# Patient Record
Sex: MALE | Race: WHITE | HISPANIC OR LATINO | Employment: FULL TIME | URBAN - NONMETROPOLITAN AREA
[De-identification: names, ages, dates, MRNs, and addresses within clinical notes are randomized per-mention and may not be internally consistent; named-entity substitution may affect disease eponyms.]

---
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
## 2021-08-19 LAB
ALBUMIN SERPL-MCNC: 4.7 G/DL (ref 3.4–5)
ALBUMIN/GLOB SERPL: 1.7 {RATIO}
ALP SERPL-CCNC: 57 U/L (ref 50–144)
ALT SERPL-CCNC: 14 U/L (ref 1–45)
ANION GAP SERPL CALC-SCNC: 7 MMOL/L (ref 7–16)
AST SERPL-CCNC: 18 U/L (ref 17–59)
BASOPHILS # BLD AUTO: 0.04 X10(3)/MCL (ref 0.01–0.08)
BASOPHILS NFR BLD AUTO: 0.7 % (ref 0.1–1.2)
BILIRUB SERPL-MCNC: 0.75 MG/DL (ref 0.1–1)
BUN SERPL-MCNC: 16 MG/DL (ref 7–20)
CALCIUM SERPL-MCNC: 9.8 MG/DL (ref 8.4–10.2)
CHLORIDE SERPL-SCNC: 102 MMOL/L (ref 94–110)
CO2 SERPL-SCNC: 29 MMOL/L (ref 21–32)
CREAT SERPL-MCNC: 1.1 MG/DL (ref 0.66–1.25)
CREAT/UREA NIT SERPL: 14.5 (ref 12–20)
EOSINOPHIL # BLD AUTO: 0.1 X10(3)/MCL (ref 0.04–0.54)
EOSINOPHIL NFR BLD AUTO: 1.7 % (ref 0.7–7)
ERYTHROCYTE [DISTWIDTH] IN BLOOD BY AUTOMATED COUNT: 12.7 % (ref 11.6–14.4)
EST CREAT CLEARANCE SER (OHS): 124.7 ML/MIN
GLOBULIN SER-MCNC: 2.8 G/DL (ref 2–3.9)
GLUCOSE SERPL-MCNC: 84 MGM./DL (ref 70–115)
HCT VFR BLD AUTO: 46.8 % (ref 36–52)
HGB BLD-MCNC: 15.8 G/DL (ref 13–18)
HIV 1+2 AB+HIV1 P24 AG SERPL QL IA: NORMAL
IMM GRANULOCYTES # BLD AUTO: 0.01 X10E3/UL (ref 0–0.03)
IMM GRANULOCYTES NFR BLD AUTO: 0.2 % (ref 0–0.5)
LYMPHOCYTES # BLD AUTO: 1.48 X10(3)/MCL (ref 1.32–3.57)
LYMPHOCYTES NFR BLD AUTO: 25.5 % (ref 20–55)
MCH RBC QN AUTO: 27.7 PG (ref 27–34)
MCHC RBC AUTO-ENTMCNC: 33.8 G/DL (ref 31–37)
MCV RBC AUTO: 82 FL (ref 79–99)
MONOCYTES # BLD AUTO: 0.61 X10(3)/MCL (ref 0.3–0.82)
MONOCYTES NFR BLD AUTO: 10.5 % (ref 4.7–12.5)
NEUTROPHILS # BLD AUTO: 3.56 X10(3)/MCL (ref 1.78–5.38)
NEUTROPHILS NFR BLD AUTO: 61.4 % (ref 37–73)
PLATELET # BLD AUTO: 252 X10(3)/MCL (ref 140–371)
PMV BLD AUTO: 9.4 FL (ref 9.4–12.4)
POTASSIUM SERPL-SCNC: 4.3 MMOL/L (ref 3.5–5.1)
PROT SERPL-MCNC: 7.5 G/DL (ref 6.3–8.2)
RBC # BLD AUTO: 5.71 X10(6)/MCL (ref 4–6)
RPR SER QL: NORMAL
SODIUM SERPL-SCNC: 138 MMOL/L (ref 135–145)
T4 FREE SERPL-MCNC: 1.12 NG/DL (ref 0.78–2.19)
TSH SERPL-ACNC: 1.24 UIU/ML (ref 0.36–3.74)
WBC # SPEC AUTO: 5.8 X10(3)/MCL (ref 4–11.5)

## 2022-04-09 ENCOUNTER — HISTORICAL (OUTPATIENT)
Dept: ADMINISTRATIVE | Facility: HOSPITAL | Age: 22
End: 2022-04-09

## 2022-04-27 VITALS
HEIGHT: 69 IN | WEIGHT: 186.5 LBS | DIASTOLIC BLOOD PRESSURE: 74 MMHG | BODY MASS INDEX: 27.62 KG/M2 | SYSTOLIC BLOOD PRESSURE: 120 MMHG | OXYGEN SATURATION: 99 %

## 2022-04-30 ENCOUNTER — HISTORICAL (OUTPATIENT)
Dept: ADMINISTRATIVE | Facility: HOSPITAL | Age: 22
End: 2022-04-30

## 2023-10-04 ENCOUNTER — OFFICE VISIT (OUTPATIENT)
Dept: FAMILY MEDICINE | Facility: CLINIC | Age: 23
End: 2023-10-04
Payer: MEDICAID

## 2023-10-04 VITALS
DIASTOLIC BLOOD PRESSURE: 80 MMHG | WEIGHT: 177 LBS | HEART RATE: 68 BPM | OXYGEN SATURATION: 98 % | TEMPERATURE: 98 F | HEIGHT: 69 IN | SYSTOLIC BLOOD PRESSURE: 116 MMHG | BODY MASS INDEX: 26.22 KG/M2

## 2023-10-04 DIAGNOSIS — F41.9 ANXIOUS MOOD: ICD-10-CM

## 2023-10-04 DIAGNOSIS — F90.9 ATTENTION DEFICIT HYPERACTIVITY DISORDER (ADHD), UNSPECIFIED ADHD TYPE: Primary | ICD-10-CM

## 2023-10-04 DIAGNOSIS — F32.A DEPRESSION, UNSPECIFIED DEPRESSION TYPE: ICD-10-CM

## 2023-10-04 PROBLEM — K59.00 CONSTIPATION: Status: ACTIVE | Noted: 2023-10-04

## 2023-10-04 PROCEDURE — 1159F MED LIST DOCD IN RCRD: CPT | Mod: CPTII,,, | Performed by: NURSE PRACTITIONER

## 2023-10-04 PROCEDURE — 3079F PR MOST RECENT DIASTOLIC BLOOD PRESSURE 80-89 MM HG: ICD-10-PCS | Mod: CPTII,,, | Performed by: NURSE PRACTITIONER

## 2023-10-04 PROCEDURE — 1159F PR MEDICATION LIST DOCUMENTED IN MEDICAL RECORD: ICD-10-PCS | Mod: CPTII,,, | Performed by: NURSE PRACTITIONER

## 2023-10-04 PROCEDURE — 1160F PR REVIEW ALL MEDS BY PRESCRIBER/CLIN PHARMACIST DOCUMENTED: ICD-10-PCS | Mod: CPTII,,, | Performed by: NURSE PRACTITIONER

## 2023-10-04 PROCEDURE — 3079F DIAST BP 80-89 MM HG: CPT | Mod: CPTII,,, | Performed by: NURSE PRACTITIONER

## 2023-10-04 PROCEDURE — 3008F BODY MASS INDEX DOCD: CPT | Mod: CPTII,,, | Performed by: NURSE PRACTITIONER

## 2023-10-04 PROCEDURE — 3008F PR BODY MASS INDEX (BMI) DOCUMENTED: ICD-10-PCS | Mod: CPTII,,, | Performed by: NURSE PRACTITIONER

## 2023-10-04 PROCEDURE — 3074F SYST BP LT 130 MM HG: CPT | Mod: CPTII,,, | Performed by: NURSE PRACTITIONER

## 2023-10-04 PROCEDURE — 99213 OFFICE O/P EST LOW 20 MIN: CPT | Mod: ,,, | Performed by: NURSE PRACTITIONER

## 2023-10-04 PROCEDURE — 3074F PR MOST RECENT SYSTOLIC BLOOD PRESSURE < 130 MM HG: ICD-10-PCS | Mod: CPTII,,, | Performed by: NURSE PRACTITIONER

## 2023-10-04 PROCEDURE — 99213 PR OFFICE/OUTPT VISIT, EST, LEVL III, 20-29 MIN: ICD-10-PCS | Mod: ,,, | Performed by: NURSE PRACTITIONER

## 2023-10-04 PROCEDURE — 1160F RVW MEDS BY RX/DR IN RCRD: CPT | Mod: CPTII,,, | Performed by: NURSE PRACTITIONER

## 2023-10-04 RX ORDER — LISDEXAMFETAMINE DIMESYLATE 40 MG/1
40 CAPSULE ORAL EVERY MORNING
Qty: 30 CAPSULE | Refills: 0 | Status: SHIPPED | OUTPATIENT
Start: 2023-10-04 | End: 2023-10-30

## 2023-10-04 NOTE — PROGRESS NOTES
Patient ID: [id number]     Chief Complaint: ADHD      HPI:     Blaise Heller is a 23 y.o. male in the office with complaints of uncontrolled symptoms of ADHD.  He graduated from college and has been off of his medications for about 2 years.  He is working full-time now as a .  He is having trouble with staying on task and not making good use of his time.  This is becoming a problem at work.  He is looking for a job in his field but notes that this will take some time.  He is starting to get depressed and feels anxious due to the uncontrolled symptoms of ADHD.  He denies any thoughts of harm to himself.  He is not currently in therapy.  He has considered it but not sure when he can find time to go.  He is struggling financially now that he is on his own.      10/4/2023     9:10 AM   Depression Patient Health Questionnaire   Over the last two weeks how often have you been bothered by little interest or pleasure in doing things Several days   Over the last two weeks how often have you been bothered by feeling down, depressed or hopeless Nearly every day   PHQ-2 Total Score 4   Over the last two weeks how often have you been bothered by trouble falling or staying asleep, or sleeping too much Several days   Over the last two weeks how often have you been bothered by feeling tired or having little energy Several days   Over the last two weeks how often have you been bothered by a poor appetite or overeating Not at all   Over the last two weeks how often have you been bothered by feeling bad about yourself - or that you are a failure or have let yourself or your family down Not at all   Over the last two weeks how often have you been bothered by trouble concentrating on things, such as reading the newspaper or watching television Nearly every day   Over the last two weeks how often have you been bothered by moving or speaking so slowly that other people could have noticed. Or the opposite - being so fidgety or  "restless that you have been moving around a lot more than usual. Nearly every day   Over the last two weeks how often have you been bothered by thoughts that you would be better off dead, or of hurting yourself Not at all   If you checked off any problems, how difficult have these problems made it for you to do your work, take care of things at home or get along with other people? Somewhat difficult   PHQ-9 Score 12   PHQ-9 Interpretation Moderate          Past Medical History:  has a past medical history of ADHD (attention deficit hyperactivity disorder), Anxiety, and Depression.    Social History:  reports that he has been smoking cigarettes and vaping with nicotine. He started smoking about 5 years ago. He has a 2.9 pack-year smoking history. He has been exposed to tobacco smoke. He has never used smokeless tobacco.    Current Outpatient Medications   Medication Instructions    lisdexamfetamine (VYVANSE) 40 mg, Oral, Every morning       Patient has No Known Allergies.     Patient Care Team:  Savita Yen FNP-C as PCP - General (Family Medicine)  Clinic, [facility]       Subjective     Review of Systems    See HPI     Objective     Visit Vitals  /80 (BP Location: Left arm)   Pulse 68   Temp 98.1 °F (36.7 °C) (Temporal)   Ht 5' 9" (1.753 m)   Wt 80.3 kg (177 lb)   SpO2 98%   BMI 26.14 kg/m²       Physical Exam  Vitals reviewed.   Constitutional:       Appearance: Normal appearance.   Cardiovascular:      Rate and Rhythm: Normal rate and regular rhythm.      Heart sounds: Normal heart sounds.   Pulmonary:      Effort: Pulmonary effort is normal.      Breath sounds: Normal breath sounds.   Skin:     General: Skin is warm and dry.   Neurological:      Mental Status: He is alert and oriented to person, place, and time.   Psychiatric:         Mood and Affect: Mood normal.         Assessment:       ICD-10-CM ICD-9-CM   1. Attention deficit hyperactivity disorder (ADHD), unspecified ADHD type  F90.9 " 314.01   2. Depression, unspecified depression type  F32.A 311   3. Anxious mood  F41.9 300.00        Plan:     Restart Vyvanse at 40 mg daily  RTC 1 month, virtual ok.    Follow up in about 1 month (around 11/4/2023) for ADD, Virtual Visit. In addition to their next scheduled appointment, the patient has also been instructed to follow up on as needed basis.     Future Appointments   Date Time Provider Department Center   11/2/2023  7:45 AM Savita Yen FNP-[facility]

## 2024-08-20 ENCOUNTER — OFFICE VISIT (OUTPATIENT)
Dept: FAMILY MEDICINE | Facility: CLINIC | Age: 24
End: 2024-08-20
Payer: MEDICAID

## 2024-08-20 VITALS
WEIGHT: 166 LBS | HEART RATE: 94 BPM | SYSTOLIC BLOOD PRESSURE: 128 MMHG | TEMPERATURE: 98 F | BODY MASS INDEX: 24.59 KG/M2 | HEIGHT: 69 IN | OXYGEN SATURATION: 98 % | DIASTOLIC BLOOD PRESSURE: 82 MMHG

## 2024-08-20 DIAGNOSIS — L73.1 INGROWING HAIR: Primary | ICD-10-CM

## 2024-08-20 PROCEDURE — 1159F MED LIST DOCD IN RCRD: CPT | Mod: CPTII,,, | Performed by: NURSE PRACTITIONER

## 2024-08-20 PROCEDURE — 1160F RVW MEDS BY RX/DR IN RCRD: CPT | Mod: CPTII,,, | Performed by: NURSE PRACTITIONER

## 2024-08-20 PROCEDURE — 4010F ACE/ARB THERAPY RXD/TAKEN: CPT | Mod: CPTII,,, | Performed by: NURSE PRACTITIONER

## 2024-08-20 PROCEDURE — 3074F SYST BP LT 130 MM HG: CPT | Mod: CPTII,,, | Performed by: NURSE PRACTITIONER

## 2024-08-20 PROCEDURE — 3079F DIAST BP 80-89 MM HG: CPT | Mod: CPTII,,, | Performed by: NURSE PRACTITIONER

## 2024-08-20 PROCEDURE — 99212 OFFICE O/P EST SF 10 MIN: CPT | Mod: ,,, | Performed by: NURSE PRACTITIONER

## 2024-08-20 PROCEDURE — 3008F BODY MASS INDEX DOCD: CPT | Mod: CPTII,,, | Performed by: NURSE PRACTITIONER

## 2024-09-17 ENCOUNTER — OFFICE VISIT (OUTPATIENT)
Dept: FAMILY MEDICINE | Facility: CLINIC | Age: 24
End: 2024-09-17
Payer: MEDICAID

## 2024-09-17 VITALS
HEIGHT: 69 IN | OXYGEN SATURATION: 96 % | TEMPERATURE: 97 F | WEIGHT: 159.38 LBS | DIASTOLIC BLOOD PRESSURE: 84 MMHG | BODY MASS INDEX: 23.6 KG/M2 | SYSTOLIC BLOOD PRESSURE: 114 MMHG | HEART RATE: 57 BPM

## 2024-09-17 DIAGNOSIS — I10 HYPERTENSION, UNSPECIFIED TYPE: Primary | ICD-10-CM

## 2024-09-17 PROCEDURE — 3074F SYST BP LT 130 MM HG: CPT | Mod: CPTII,,, | Performed by: NURSE PRACTITIONER

## 2024-09-17 PROCEDURE — 99213 OFFICE O/P EST LOW 20 MIN: CPT | Mod: ,,, | Performed by: NURSE PRACTITIONER

## 2024-09-17 PROCEDURE — 3008F BODY MASS INDEX DOCD: CPT | Mod: CPTII,,, | Performed by: NURSE PRACTITIONER

## 2024-09-17 PROCEDURE — 3079F DIAST BP 80-89 MM HG: CPT | Mod: CPTII,,, | Performed by: NURSE PRACTITIONER

## 2024-09-17 PROCEDURE — 1159F MED LIST DOCD IN RCRD: CPT | Mod: CPTII,,, | Performed by: NURSE PRACTITIONER

## 2024-09-17 RX ORDER — HYDROCHLOROTHIAZIDE 25 MG/1
25 TABLET ORAL DAILY
COMMUNITY
Start: 2024-09-14 | End: 2024-09-17

## 2024-09-17 NOTE — PROGRESS NOTES
"SUBJECTIVE:  Blaise Heller is a 24 y.o. male here for Hypertension      HPI  Presents to the clinic in follow-up for HTN.  Was seen at urgent care with a BP of 160s/100s. Reports he was not feeling well with a cough, chest discomfort and some SOB.  They put him on HCTZ.  Today his BP is 114/84.  He denies any additin CP or SOB.  He has a home BP monitor.    Daniel allergies, medications, history, and problem list were updated as appropriate.    Review of Systems   A comprehensive review of symptoms was completed and negative except as noted above.    No results found for this or any previous visit (from the past 504 hour(s)).    OBJECTIVE:  Vital signs  Vitals:    09/17/24 0836   BP: 114/84   BP Location: Right arm   Patient Position: Sitting   BP Method: Medium (Manual)   Pulse: (!) 57   Temp: 97.3 °F (36.3 °C)   TempSrc: Oral   SpO2: 96%   Weight: 72.3 kg (159 lb 6.4 oz)   Height: 5' 9.02" (1.753 m)        Physical Exam  Constitutional:       Appearance: Normal appearance.   HENT:      Head: Normocephalic and atraumatic.      Nose: Nose normal.      Mouth/Throat:      Mouth: Mucous membranes are moist.      Pharynx: Oropharynx is clear.   Eyes:      Conjunctiva/sclera: Conjunctivae normal.   Cardiovascular:      Rate and Rhythm: Normal rate and regular rhythm.   Pulmonary:      Effort: Pulmonary effort is normal.      Breath sounds: Normal breath sounds.   Abdominal:      General: Bowel sounds are normal.      Palpations: Abdomen is soft.   Skin:     General: Skin is warm.      Capillary Refill: Capillary refill takes less than 2 seconds.   Neurological:      Mental Status: He is alert.   Psychiatric:         Mood and Affect: Mood normal.         Behavior: Behavior normal.         Judgment: Judgment normal.          ASSESSMENT/PLAN:  1. Hypertension, unspecified type  Comments:  discontinue HCTZ, do home BP monitoring at least twice dialy, follow-up in 2 weeks with log, if BP worrisome call/ RTC        Follow " Up:  Follow up in about 2 weeks (around 10/1/2024), or if symptoms worsen or fail to improve.

## 2024-10-02 NOTE — PROGRESS NOTES
"Patient ID: [id number]     Chief Complaint: Mass (possibly)      HPI:     Blaise Heller is a 24 y.o. male in the office for Mass (possibly)  Felt a red bump in his groin last week.  Was tender, got bigger so scheduled this appointment and size has decreased significantly since that time.  No drainage.      Past Medical History:  has a past medical history of ADHD (attention deficit hyperactivity disorder), Anxiety, and Depression.    Social History:  reports that he has been smoking vaping with nicotine and cigarettes. He started smoking about 6 years ago. He has a 6.6 pack-year smoking history. He has been exposed to tobacco smoke. He has never used smokeless tobacco. He reports that he does not currently use alcohol. He reports current drug use. Drug: Marijuana.    Current Outpatient Medications   Medication Instructions    lisinopriL (PRINIVIL,ZESTRIL) 5 mg, Oral, Daily       Patient has No Known Allergies.     Patient Care Team:  Savita Yen FNP-C as PCP - General (Family Medicine)  Clinic[facility]     Subjective     Review of Systems    See HPI     Objective     Visit Vitals  /82 (BP Location: Left arm, Patient Position: Sitting, BP Method: Medium (Manual))   Pulse 94   Temp 98.2 °F (36.8 °C) (Temporal)   Ht 5' 9.02" (1.753 m)   Wt 75.3 kg (166 lb)   SpO2 98%   BMI 24.50 kg/m²       Physical Exam  Vitals reviewed.   Constitutional:       General: He is not in acute distress.  Pulmonary:      Effort: Pulmonary effort is normal.   Skin:     General: Skin is warm and dry.      Findings: Lesion (left groin with erythematous hair follicle, soft, non tender.) present.   Neurological:      Mental Status: He is alert and oriented to person, place, and time.   Psychiatric:         Mood and Affect: Mood normal.         Assessment & Plan:     1. Ingrowing hair  Comments:  discouraged shaving   warm moist compress, RTC SWOP         Follow up if symptoms worsen or fail to improve. In addition to their " next scheduled appointment, the patient has also been instructed to follow up on as needed basis.     Future Appointments   Date Time Provider Department Center   11/19/2024  2:30 PM Savita Yen, [facility]

## 2024-10-08 ENCOUNTER — OFFICE VISIT (OUTPATIENT)
Dept: FAMILY MEDICINE | Facility: CLINIC | Age: 24
End: 2024-10-08

## 2024-10-08 VITALS
DIASTOLIC BLOOD PRESSURE: 88 MMHG | OXYGEN SATURATION: 99 % | TEMPERATURE: 97 F | BODY MASS INDEX: 24.68 KG/M2 | HEIGHT: 69 IN | SYSTOLIC BLOOD PRESSURE: 116 MMHG | HEART RATE: 55 BPM | WEIGHT: 166.63 LBS

## 2024-10-08 DIAGNOSIS — I10 HYPERTENSION, UNSPECIFIED TYPE: Primary | ICD-10-CM

## 2024-10-08 PROCEDURE — 99212 OFFICE O/P EST SF 10 MIN: CPT | Mod: ,,, | Performed by: NURSE PRACTITIONER

## 2024-10-08 RX ORDER — LISINOPRIL 5 MG/1
5 TABLET ORAL DAILY
Qty: 30 TABLET | Refills: 2 | Status: SHIPPED | OUTPATIENT
Start: 2024-10-08

## 2024-10-08 NOTE — PROGRESS NOTES
"SUBJECTIVE:  Blaise Heller is a 24 y.o. male here for Hypertension      HPI  Presents to the clinic in follow-up.  Has been monitoring his BP at home and it has been mildly elevated, 150s/90s average at home being taken at different times of the day.  He had been seen in urgent care and put on HCTZ which he did not tolerate.  It was discontinued and he monitored his BP for 2 weeks to assess.  Daniel allergies, medications, history, and problem list were updated as appropriate.    Review of Systems   A comprehensive review of symptoms was completed and negative except as noted above.    No results found for this or any previous visit (from the past 3 weeks).    OBJECTIVE:  Vital signs  Vitals:    10/08/24 0827   BP: 116/88   BP Location: Right arm   Patient Position: Sitting   Pulse: (!) 55   Temp: 97.2 °F (36.2 °C)   TempSrc: Oral   SpO2: 99%   Weight: 75.6 kg (166 lb 9.6 oz)   Height: 5' 9.02" (1.753 m)        Physical Exam  Constitutional:       Appearance: Normal appearance.   HENT:      Head: Normocephalic and atraumatic.      Nose: Nose normal.      Mouth/Throat:      Mouth: Mucous membranes are moist.      Pharynx: Oropharynx is clear.   Eyes:      Conjunctiva/sclera: Conjunctivae normal.   Cardiovascular:      Rate and Rhythm: Normal rate and regular rhythm.   Pulmonary:      Effort: Pulmonary effort is normal.      Breath sounds: Normal breath sounds.   Abdominal:      General: Bowel sounds are normal.      Palpations: Abdomen is soft.   Skin:     General: Skin is warm.      Capillary Refill: Capillary refill takes less than 2 seconds.   Neurological:      Mental Status: He is alert.          ASSESSMENT/PLAN:  1. Hypertension, unspecified type  Comments:  will start low does lisinopril and follow-up in 1 month with Savita or sooner if needed  Orders:  -     lisinopriL (PRINIVIL,ZESTRIL) 5 MG tablet; Take 1 tablet (5 mg total) by mouth once daily.  Dispense: 30 tablet; Refill: 2        Follow Up:  Follow up " in about 1 month (around 11/8/2024).

## 2024-11-19 ENCOUNTER — OFFICE VISIT (OUTPATIENT)
Dept: FAMILY MEDICINE | Facility: CLINIC | Age: 24
End: 2024-11-19
Payer: MEDICAID

## 2024-11-19 VITALS
DIASTOLIC BLOOD PRESSURE: 82 MMHG | HEART RATE: 65 BPM | BODY MASS INDEX: 24.76 KG/M2 | SYSTOLIC BLOOD PRESSURE: 128 MMHG | TEMPERATURE: 98 F | HEIGHT: 69 IN | WEIGHT: 167.19 LBS | OXYGEN SATURATION: 97 %

## 2024-11-19 DIAGNOSIS — I10 HYPERTENSION, UNSPECIFIED TYPE: ICD-10-CM

## 2024-11-19 DIAGNOSIS — Z00.00 ENCOUNTER FOR ROUTINE ADULT HEALTH EXAMINATION WITHOUT ABNORMAL FINDINGS: Primary | ICD-10-CM

## 2024-11-19 PROBLEM — F32.A DEPRESSED: Status: RESOLVED | Noted: 2023-10-04 | Resolved: 2024-11-19

## 2024-11-19 PROCEDURE — 82570 ASSAY OF URINE CREATININE: CPT | Performed by: NURSE PRACTITIONER

## 2024-11-19 PROCEDURE — 81003 URINALYSIS AUTO W/O SCOPE: CPT | Performed by: NURSE PRACTITIONER

## 2024-11-19 PROCEDURE — 3079F DIAST BP 80-89 MM HG: CPT | Mod: CPTII,,, | Performed by: NURSE PRACTITIONER

## 2024-11-19 PROCEDURE — 1160F RVW MEDS BY RX/DR IN RCRD: CPT | Mod: CPTII,,, | Performed by: NURSE PRACTITIONER

## 2024-11-19 PROCEDURE — 3008F BODY MASS INDEX DOCD: CPT | Mod: CPTII,,, | Performed by: NURSE PRACTITIONER

## 2024-11-19 PROCEDURE — 3074F SYST BP LT 130 MM HG: CPT | Mod: CPTII,,, | Performed by: NURSE PRACTITIONER

## 2024-11-19 PROCEDURE — 99395 PREV VISIT EST AGE 18-39: CPT | Mod: ,,, | Performed by: NURSE PRACTITIONER

## 2024-11-19 PROCEDURE — 4010F ACE/ARB THERAPY RXD/TAKEN: CPT | Mod: CPTII,,, | Performed by: NURSE PRACTITIONER

## 2024-11-19 PROCEDURE — 1159F MED LIST DOCD IN RCRD: CPT | Mod: CPTII,,, | Performed by: NURSE PRACTITIONER

## 2024-11-19 NOTE — PROGRESS NOTES
Patient ID: Blaise Heller  : 2000    Chief Complaint: Annual Exam (wellness)    Allergies: Patient has No Known Allergies.     History of Present Illness:  The patient is a 24 y.o. White male who presents to clinic for annual wellness visit.    He was diagnosed with HTN about 2 months ago.  Didn't tolerate HCTZ.  Now on lisinopril for the last month.  He doesn't like the way it makes him feel, martin tired. No known workup for HTN. He's been out for about 3 days.  He reports log is around 130/80 with or without the lisinopril 5 mg.  Sometimes blood pressure is as high as 160s/100.  He has been monitoring it daily.  He has noticed that after he smokes marijuana his blood pressure goes down.  He does admit to some anxiety but only since being told about his high blood pressure.  Denies close family history of early CAD.    Past Medical History:  has a past medical history of ADHD (attention deficit hyperactivity disorder), Anxiety, and Depression.    Surgical History:  has a past surgical history that includes Adenoidectomy; Tonsillectomy; and Hernia repair.    Family History: family history includes Bipolar disorder in his mother; COPD in his mother; Hypertension in his father.    Social History:  reports that he has been smoking vaping with nicotine and cigarettes. He started smoking about 6 years ago. He has a 0.5 pack-year smoking history. He has been exposed to tobacco smoke. He has never used smokeless tobacco. He reports that he does not currently use alcohol. He reports current drug use. Drug: Marijuana.    Care Team: Patient Care Team:  Savita Yen FNP-C as PCP - General (Family Medicine)  Clinic[facility]     Current Medications:  Current Outpatient Medications   Medication Instructions    lisinopriL (PRINIVIL,ZESTRIL) 5 mg, Oral, Daily       Review of Systems     Visit Vitals  /82 (BP Location: Right arm, Patient Position: Sitting)   Pulse 65   Temp 97.5 °F (36.4 °C)  "(Temporal)   Ht 5' 9.02" (1.753 m)   Wt 75.8 kg (167 lb 3.2 oz)   SpO2 97%   BMI 24.68 kg/m²       Physical Exam  Vitals reviewed.   Constitutional:       General: He is not in acute distress.  HENT:      Head: Normocephalic.      Nose: Nose normal.      Mouth/Throat:      Mouth: Mucous membranes are moist.      Pharynx: Oropharynx is clear.   Eyes:      Pupils: Pupils are equal, round, and reactive to light.   Cardiovascular:      Rate and Rhythm: Normal rate and regular rhythm.   Pulmonary:      Effort: Pulmonary effort is normal.      Breath sounds: Normal breath sounds.   Abdominal:      General: Bowel sounds are normal.      Palpations: Abdomen is soft.      Tenderness: There is no abdominal tenderness.   Musculoskeletal:         General: Normal range of motion.      Cervical back: Normal range of motion and neck supple.      Right lower leg: No edema.      Left lower leg: No edema.   Skin:     General: Skin is warm and dry.   Neurological:      Mental Status: He is alert and oriented to person, place, and time. Mental status is at baseline.   Psychiatric:         Mood and Affect: Mood normal.         Behavior: Behavior normal.        Assessment & Plan:  1. Encounter for routine adult health examination without abnormal findings  -     CBC Auto Differential; Future; Expected date: 11/19/2024  -     Comprehensive Metabolic Panel; Future; Expected date: 11/19/2024  -     Lipid Panel; Future; Expected date: 11/19/2024  -     Hemoglobin A1C; Future; Expected date: 11/19/2024  -     TSH; Future; Expected date: 11/19/2024  -     HIV 1/2 Ag/Ab (4th Gen); Future; Expected date: 11/19/2024  -     HCV Antibody RFX to Quant PCR; Future; Expected date: 11/19/2024    2. Hypertension, unspecified type  Overview:  On lisinopril 5 mg, Didn't tolerate HCTZ    Assessment & Plan:  Stop lisinopril (SE).    Lifestyle modifications for the next 3-6 months and follow up.    Orders:  -     CBC Auto Differential; Future; Expected date: " 11/19/2024  -     Comprehensive Metabolic Panel; Future; Expected date: 11/19/2024  -     Lipid Panel; Future; Expected date: 11/19/2024  -     Hemoglobin A1C; Future; Expected date: 11/19/2024  -     TSH; Future; Expected date: 11/19/2024  -     HIV 1/2 Ag/Ab (4th Gen); Future; Expected date: 11/19/2024  -     HCV Antibody RFX to Quant PCR; Future; Expected date: 11/19/2024  -     T4, Free; Future; Expected date: 11/19/2024  -     POCT EKG 12-LEAD (Manually Resulted by Ordering Provider)  -     Urinalysis, Reflex to Urine Culture  -     Microalbumin/Creatinine Ratio, Urine         Vaccinations:  Immunization History   Administered Date(s) Administered    COVID-19, MRNA, LN-S, PF (MODERNA FULL 0.5 ML DOSE) 09/08/2021, 09/08/2021, 10/11/2021    DTaP 2001, 2001, 01/02/2003, 11/28/2006    HIB 03/13/2002    HPV 9-Valent 07/07/2016, 09/11/2017    Hep B / HiB 2001, 2001    Hepatitis A, Pediatric/Adolescent, 2 Dose 08/09/2016, 09/11/2017    Hepatitis B, Pediatric/Adolescent 2001, 03/13/2002    Hib-HbOC 01/02/2003    IPV 2001, 2001, 11/28/2006    Influenza 10/19/2007, 10/11/2010    Influenza - Intranasal - Trivalent 10/19/2007, 10/11/2010    Influenza - Quadrivalent - PF *Preferred* (6 months and older) 02/24/2020    MMR 08/05/2002, 11/28/2006    Meningococcal B 06/11/2019    Meningococcal B, recombinant 06/11/2019, 02/24/2020    Meningococcal Conjugate (MCV4P) 07/31/2012, 09/11/2017    Pneumococcal Conjugate - 7 Valent 03/13/2002, 04/14/2002, 04/19/2002    Tdap 07/31/2012    Varicella 08/05/2002, 12/10/2010       Future Appointments   Date Time Provider Department Center   2/25/2025  8:30 AM YovanaSavita mahajan FNP-C [facility]   11/25/2025  8:00 AM Savita Yen FNP-C [facility]       Follow up for 1), 1 yr, Wellness, 1) 3 mo BP. Call sooner if needed.

## 2024-11-20 ENCOUNTER — LAB VISIT (OUTPATIENT)
Dept: LAB | Facility: HOSPITAL | Age: 24
End: 2024-11-20
Attending: NURSE PRACTITIONER
Payer: MEDICAID

## 2024-11-20 DIAGNOSIS — I10 HYPERTENSION, UNSPECIFIED TYPE: ICD-10-CM

## 2024-11-20 DIAGNOSIS — Z00.00 ENCOUNTER FOR ROUTINE ADULT HEALTH EXAMINATION WITHOUT ABNORMAL FINDINGS: ICD-10-CM

## 2024-11-20 LAB
ALBUMIN SERPL-MCNC: 4.9 G/DL (ref 3.4–5)
ALBUMIN/GLOB SERPL: 2 RATIO
ALP SERPL-CCNC: 37 UNIT/L (ref 50–144)
ALT SERPL-CCNC: 11 UNIT/L (ref 1–45)
ANION GAP SERPL CALC-SCNC: 7 MEQ/L (ref 2–13)
AST SERPL-CCNC: 17 UNIT/L (ref 17–59)
BASOPHILS # BLD AUTO: 0.04 X10(3)/MCL (ref 0.01–0.08)
BASOPHILS NFR BLD AUTO: 0.8 % (ref 0.1–1.2)
BILIRUB SERPL-MCNC: 1 MG/DL (ref 0–1)
BILIRUB UR QL STRIP.AUTO: NEGATIVE
BUN SERPL-MCNC: 21 MG/DL (ref 7–20)
CALCIUM SERPL-MCNC: 9.9 MG/DL (ref 8.4–10.2)
CHLORIDE SERPL-SCNC: 104 MMOL/L (ref 98–110)
CHOLEST SERPL-MCNC: 243 MG/DL (ref 0–200)
CLARITY UR: CLEAR
CO2 SERPL-SCNC: 28 MMOL/L (ref 21–32)
COLOR UR AUTO: YELLOW
CREAT SERPL-MCNC: 1.29 MG/DL (ref 0.66–1.25)
CREAT UR-MCNC: 117.2 MG/DL (ref 63–166)
CREAT/UREA NIT SERPL: 16 (ref 12–20)
EOSINOPHIL # BLD AUTO: 0.09 X10(3)/MCL (ref 0.04–0.54)
EOSINOPHIL NFR BLD AUTO: 1.7 % (ref 0.7–7)
ERYTHROCYTE [DISTWIDTH] IN BLOOD BY AUTOMATED COUNT: 12.9 %
EST. AVERAGE GLUCOSE BLD GHB EST-MCNC: 91.1 MG/DL (ref 70–115)
GFR SERPLBLD CREATININE-BSD FMLA CKD-EPI: 80 ML/MIN/1.73/M2
GLOBULIN SER-MCNC: 2.5 GM/DL (ref 2–3.9)
GLUCOSE SERPL-MCNC: 92 MG/DL (ref 70–115)
GLUCOSE UR QL STRIP: NEGATIVE
HBA1C MFR BLD: 4.8 % (ref 4–6)
HCT VFR BLD AUTO: 47.6 % (ref 36–52)
HCV AB SERPL QL IA: NONREACTIVE
HDLC SERPL-MCNC: 43 MG/DL (ref 40–60)
HGB BLD-MCNC: 16.4 G/DL (ref 13–18)
HGB UR QL STRIP: NEGATIVE
HIV 1+2 AB+HIV1 P24 AG SERPL QL IA: NONREACTIVE
IMM GRANULOCYTES # BLD AUTO: 0.01 X10(3)/MCL (ref 0–0.03)
IMM GRANULOCYTES NFR BLD AUTO: 0.2 % (ref 0–0.5)
KETONES UR QL STRIP: NEGATIVE
LDLC SERPL DIRECT ASSAY-SCNC: 167.5 MG/DL (ref 30–100)
LEUKOCYTE ESTERASE UR QL STRIP: NEGATIVE
LYMPHOCYTES # BLD AUTO: 1.48 X10(3)/MCL (ref 1.32–3.57)
LYMPHOCYTES NFR BLD AUTO: 28 % (ref 20–55)
MCH RBC QN AUTO: 28.4 PG (ref 27–34)
MCHC RBC AUTO-ENTMCNC: 34.5 G/DL (ref 31–37)
MCV RBC AUTO: 82.5 FL (ref 79–99)
MICROALBUMIN UR-MCNC: <5 UG/ML
MICROALBUMIN/CREAT RATIO PNL UR: NORMAL
MONOCYTES # BLD AUTO: 0.42 X10(3)/MCL (ref 0.3–0.82)
MONOCYTES NFR BLD AUTO: 8 % (ref 4.7–12.5)
NEUTROPHILS # BLD AUTO: 3.24 X10(3)/MCL (ref 1.78–5.38)
NEUTROPHILS NFR BLD AUTO: 61.3 % (ref 37–73)
NITRITE UR QL STRIP: NEGATIVE
NRBC BLD AUTO-RTO: 0 %
PH UR STRIP: 7.5 [PH]
PLATELET # BLD AUTO: 216 X10(3)/MCL (ref 140–371)
PMV BLD AUTO: 9.3 FL (ref 9.4–12.4)
POTASSIUM SERPL-SCNC: 4.5 MMOL/L (ref 3.5–5.1)
PROT SERPL-MCNC: 7.4 GM/DL (ref 6.3–8.2)
PROT UR QL STRIP: NEGATIVE
RBC # BLD AUTO: 5.77 X10(6)/MCL (ref 4–6)
SODIUM SERPL-SCNC: 139 MMOL/L (ref 136–145)
SP GR UR STRIP.AUTO: 1.02 (ref 1–1.03)
T4 FREE SERPL-MCNC: 0.81 NG/DL (ref 0.78–2.19)
TRIGL SERPL-MCNC: 98 MG/DL (ref 30–200)
TSH SERPL-ACNC: 1.43 UIU/ML (ref 0.36–3.74)
UROBILINOGEN UR STRIP-ACNC: 0.2
WBC # BLD AUTO: 5.28 X10(3)/MCL (ref 4–11.5)

## 2024-11-20 PROCEDURE — 84443 ASSAY THYROID STIM HORMONE: CPT

## 2024-11-20 PROCEDURE — 85025 COMPLETE CBC W/AUTO DIFF WBC: CPT

## 2024-11-20 PROCEDURE — 86803 HEPATITIS C AB TEST: CPT

## 2024-11-20 PROCEDURE — 36415 COLL VENOUS BLD VENIPUNCTURE: CPT

## 2024-11-20 PROCEDURE — 87389 HIV-1 AG W/HIV-1&-2 AB AG IA: CPT

## 2024-11-20 PROCEDURE — 84439 ASSAY OF FREE THYROXINE: CPT

## 2024-11-20 PROCEDURE — 83036 HEMOGLOBIN GLYCOSYLATED A1C: CPT

## 2024-11-20 PROCEDURE — 80061 LIPID PANEL: CPT

## 2024-11-20 PROCEDURE — 80053 COMPREHEN METABOLIC PANEL: CPT

## 2025-02-25 ENCOUNTER — OFFICE VISIT (OUTPATIENT)
Dept: FAMILY MEDICINE | Facility: CLINIC | Age: 25
End: 2025-02-25
Payer: MEDICAID

## 2025-02-25 VITALS
DIASTOLIC BLOOD PRESSURE: 78 MMHG | HEART RATE: 67 BPM | OXYGEN SATURATION: 98 % | TEMPERATURE: 98 F | HEIGHT: 69 IN | BODY MASS INDEX: 24.94 KG/M2 | SYSTOLIC BLOOD PRESSURE: 104 MMHG | WEIGHT: 168.38 LBS

## 2025-02-25 DIAGNOSIS — F41.9 ANXIETY: ICD-10-CM

## 2025-02-25 DIAGNOSIS — I10 HYPERTENSION, UNSPECIFIED TYPE: Primary | ICD-10-CM

## 2025-02-25 PROCEDURE — 3008F BODY MASS INDEX DOCD: CPT | Mod: CPTII,,, | Performed by: NURSE PRACTITIONER

## 2025-02-25 PROCEDURE — 1160F RVW MEDS BY RX/DR IN RCRD: CPT | Mod: CPTII,,, | Performed by: NURSE PRACTITIONER

## 2025-02-25 PROCEDURE — 1159F MED LIST DOCD IN RCRD: CPT | Mod: CPTII,,, | Performed by: NURSE PRACTITIONER

## 2025-02-25 PROCEDURE — 3078F DIAST BP <80 MM HG: CPT | Mod: CPTII,,, | Performed by: NURSE PRACTITIONER

## 2025-02-25 PROCEDURE — 3074F SYST BP LT 130 MM HG: CPT | Mod: CPTII,,, | Performed by: NURSE PRACTITIONER

## 2025-02-25 PROCEDURE — 99213 OFFICE O/P EST LOW 20 MIN: CPT | Mod: ,,, | Performed by: NURSE PRACTITIONER

## 2025-02-25 NOTE — PROGRESS NOTES
"Patient ID: Blaise Heller  : 2000    Chief Complaint: Hypertension    Allergies: Patient has no known allergies.     History of Present Illness:  The patient is a 24 y.o. White male who presents to clinic for annual wellness visit.    Following up on hypertension.  Has been off medications for 3 months now, no elevations.  He feels like now that his anxiety is better controlled, he's not having any problems with BP. Treating his anxiety with medical marijuana.     Past Medical History:  has a past medical history of ADHD (attention deficit hyperactivity disorder), Anxiety, and Depression.    Surgical History:  has a past surgical history that includes Adenoidectomy; Tonsillectomy; and Hernia repair.    Family History: family history includes Bipolar disorder in his mother; COPD in his mother; Hypertension in his father.    Social History:  reports that he has been smoking vaping with nicotine and cigarettes. He started smoking about 7 years ago. He has a 0.5 pack-year smoking history. He has been exposed to tobacco smoke. He has never used smokeless tobacco. He reports that he does not currently use alcohol. He reports current drug use. Drug: Marijuana.    Care Team: Patient Care Team:  Savita Yen FNP-C as PCP - General (Family Medicine)  Clinic[facility]     Current Medications:  No current outpatient medications    Review of Systems     Visit Vitals  /78 (BP Location: Left arm, Patient Position: Sitting)   Pulse 67   Temp 97.7 °F (36.5 °C) (Temporal)   Ht 5' 9.02" (1.753 m)   Wt 76.4 kg (168 lb 6.4 oz)   SpO2 98%   BMI 24.86 kg/m²       Physical Exam  Constitutional:       Appearance: Normal appearance.   HENT:      Head: Normocephalic and atraumatic.   Cardiovascular:      Rate and Rhythm: Normal rate and regular rhythm.   Pulmonary:      Effort: Pulmonary effort is normal.   Skin:     General: Skin is warm.      Capillary Refill: Capillary refill takes less than 2 seconds. "   Neurological:      Mental Status: He is alert.   Psychiatric:         Mood and Affect: Mood normal.         Behavior: Behavior normal.         Judgment: Judgment normal.        Labs Reviewed:  Chemistry:  Lab Results   Component Value Date     11/20/2024    K 4.5 11/20/2024    BUN 21 (H) 11/20/2024    CREATININE 1.29 (H) 11/20/2024    EGFRNORACEVR 80 11/20/2024    GLUCOSE 92 11/20/2024    CALCIUM 9.9 11/20/2024    ALKPHOS 37 (L) 11/20/2024    LABPROT 7.4 11/20/2024    ALBUMIN 4.9 11/20/2024    AST 17 11/20/2024    ALT 11 11/20/2024    TSH 1.430 11/20/2024    BOXUVF8QRSC 0.81 11/20/2024        Lab Results   Component Value Date    HGBA1C 4.8 11/20/2024        Hematology:  Lab Results   Component Value Date    WBC 5.28 11/20/2024    RBC 5.77 11/20/2024    HGB 16.4 11/20/2024    HCT 47.6 11/20/2024    MCV 82.5 11/20/2024    MCH 28.4 11/20/2024    MCHC 34.5 11/20/2024    RDW 12.9 11/20/2024     11/20/2024    MPV 9.3 (L) 11/20/2024       Lipid Panel:  Lab Results   Component Value Date    CHOL 243 (H) 11/20/2024    HDL 43 11/20/2024    LDLDIRECT 167.5 (H) 11/20/2024    TRIG 98 11/20/2024         Assessment & Plan:  1. Hypertension, unspecified type  Overview:  Anxiety controlled.  No longer HTN  Off lisinopril 5 mg, Didn't tolerate HCTZ.  This diagnosis will be resolved after today's visit.       2. Anxiety  Overview:  Self medicating with medical marijuana.       Vaccinations:  Immunization History   Administered Date(s) Administered    COVID-19, MRNA, LN-S, PF (MODERNA FULL 0.5 ML DOSE) 09/08/2021, 09/08/2021, 10/11/2021    DTaP 2001, 2001, 01/02/2003, 11/28/2006    HIB 03/13/2002    HPV 9-Valent 07/07/2016, 09/11/2017    Hep B / HiB 2001, 2001    Hepatitis A, Pediatric/Adolescent, 2 Dose 08/09/2016, 09/11/2017    Hepatitis B, Pediatric/Adolescent 2001, 03/13/2002    Hib-HbOC 01/02/2003    IPV 2001, 2001, 11/28/2006    Influenza 10/19/2007, 10/11/2010     Influenza - Intranasal - Trivalent 10/19/2007, 10/11/2010    Influenza - Quadrivalent - PF *Preferred* (6 months and older) 02/24/2020    MMR 08/05/2002, 11/28/2006    Meningococcal B 06/11/2019    Meningococcal B, recombinant 06/11/2019, 02/24/2020    Meningococcal Conjugate (MCV4P) 07/31/2012, 09/11/2017    Pneumococcal Conjugate - 7 Valent 03/13/2002, 04/14/2002, 04/19/2002    Tdap 07/31/2012    Varicella 08/05/2002, 12/10/2010       Future Appointments   Date Time Provider Department Center   11/18/2025  8:50 AM LAB, [facility]AT[facility]   11/25/2025  8:00 [facility]       Follow up for schedule fasting lab appt before wellenss 11/2025. Call sooner if needed.